# Patient Record
Sex: FEMALE | Race: WHITE
[De-identification: names, ages, dates, MRNs, and addresses within clinical notes are randomized per-mention and may not be internally consistent; named-entity substitution may affect disease eponyms.]

---

## 2021-08-23 ENCOUNTER — HOSPITAL ENCOUNTER (EMERGENCY)
Dept: HOSPITAL 46 - ED | Age: 51
Discharge: HOME | End: 2021-08-23
Payer: COMMERCIAL

## 2021-08-23 VITALS — BODY MASS INDEX: 27.31 KG/M2 | HEIGHT: 64 IN | WEIGHT: 159.99 LBS

## 2021-08-23 DIAGNOSIS — I10: ICD-10-CM

## 2021-08-23 DIAGNOSIS — Z79.899: ICD-10-CM

## 2021-08-23 DIAGNOSIS — Z88.0: ICD-10-CM

## 2021-08-23 DIAGNOSIS — R10.13: Primary | ICD-10-CM

## 2021-08-23 DIAGNOSIS — E78.00: ICD-10-CM

## 2021-08-23 NOTE — XMS
PreManage Notification: HERMES MANCUSO MRN:[id number]
 
Security Information
 
Security Events
No recent Security Events currently on file
 
 
 
[facility]IA MET
------------
[facility]
 
 
CARE PROVIDERS
There are no care providers on record at this time.
 
[facility] has no Care Guidelines for this patient.
 
MENA VISIT COUNT (12 MO.)
-------------------------------------------------------------------------------------
1 JUAN JOSE Odell
-------------------------------------------------------------------------------------
TOTAL 1
-------------------------------------------------------------------------------------
NOTE: Visits indicate total known visits.
 
ED/C VISIT TRACKING (12 MO.)
-------------------------------------------------------------------------------------
08/23/2021 01:51
[facility] [place]
 
TYPE: Emergency
 
COMPLAINT:
- STOMACH PAIN
-------------------------------------------------------------------------------------
 
 
INPATIENT VISIT TRACKING (12 MO.)
No inpatient visits to display in this time frame
 
[email or web address] yes

## 2023-01-15 ENCOUNTER — HOSPITAL ENCOUNTER (EMERGENCY)
Dept: HOSPITAL 46 - ED | Age: 53
Discharge: HOME | End: 2023-01-15
Payer: COMMERCIAL

## 2023-01-15 VITALS — HEIGHT: 64 IN | WEIGHT: 159.99 LBS | BODY MASS INDEX: 27.31 KG/M2

## 2023-01-15 DIAGNOSIS — I10: ICD-10-CM

## 2023-01-15 DIAGNOSIS — E78.00: ICD-10-CM

## 2023-01-15 DIAGNOSIS — Z79.899: ICD-10-CM

## 2023-01-15 DIAGNOSIS — Z88.0: ICD-10-CM
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
## 2023-01-15 DIAGNOSIS — S61.451A: Primary | ICD-10-CM

## 2023-01-15 PROCEDURE — A9270 NON-COVERED ITEM OR SERVICE: HCPCS

## 2023-01-15 NOTE — XMS
PreManage Notification: HERMES MANCUSO MRN:[id number]
 
Security Information
 
Security Events
No recent Security Events currently on file
 
 
 
CRITERIA MET
------------
- [facility]
 
 
CARE PROVIDERS
-------------------------------------------------------------------------------------
[facility]     08/25/2021-Current
 
PHONE: [phone]
-------------------------------------------------------------------------------------
 
[facility] has no Care Guidelines for this patient.
 
EDIDIER VISIT COUNT (12 MO.)
-------------------------------------------------------------------------------------
1 JUAN JOSE Odell
-------------------------------------------------------------------------------------
TOTAL 1
-------------------------------------------------------------------------------------
NOTE: Visits indicate total known visits.
 
ED/UCC VISIT TRACKING (12 MO.)
-------------------------------------------------------------------------------------
01/15/2023 16:25
[facility] [place]
 
TYPE: Emergency
 
COMPLAINT:
- BITE
-------------------------------------------------------------------------------------
 
 
INPATIENT VISIT TRACKING (12 MO.)
No inpatient visits to display in this time frame
 
[email or web address]